# Patient Record
Sex: FEMALE | Race: WHITE
[De-identification: names, ages, dates, MRNs, and addresses within clinical notes are randomized per-mention and may not be internally consistent; named-entity substitution may affect disease eponyms.]

---

## 2020-06-04 ENCOUNTER — HOSPITAL ENCOUNTER (EMERGENCY)
Dept: HOSPITAL 62 - ER | Age: 35
LOS: 1 days | Discharge: HOME | End: 2020-06-05
Payer: MEDICAID

## 2020-06-04 DIAGNOSIS — Z88.2: ICD-10-CM

## 2020-06-04 DIAGNOSIS — R11.0: ICD-10-CM

## 2020-06-04 DIAGNOSIS — R30.0: ICD-10-CM

## 2020-06-04 DIAGNOSIS — R50.9: ICD-10-CM

## 2020-06-04 DIAGNOSIS — Z88.0: ICD-10-CM

## 2020-06-04 DIAGNOSIS — N12: Primary | ICD-10-CM

## 2020-06-04 DIAGNOSIS — R31.9: ICD-10-CM

## 2020-06-04 PROCEDURE — 87086 URINE CULTURE/COLONY COUNT: CPT

## 2020-06-04 PROCEDURE — S0119 ONDANSETRON 4 MG: HCPCS

## 2020-06-04 PROCEDURE — 87088 URINE BACTERIA CULTURE: CPT

## 2020-06-04 PROCEDURE — 81025 URINE PREGNANCY TEST: CPT

## 2020-06-04 PROCEDURE — 99283 EMERGENCY DEPT VISIT LOW MDM: CPT

## 2020-06-04 PROCEDURE — 81001 URINALYSIS AUTO W/SCOPE: CPT

## 2020-06-04 PROCEDURE — 87186 SC STD MICRODIL/AGAR DIL: CPT

## 2020-06-04 PROCEDURE — 36415 COLL VENOUS BLD VENIPUNCTURE: CPT

## 2020-06-05 VITALS — SYSTOLIC BLOOD PRESSURE: 115 MMHG | DIASTOLIC BLOOD PRESSURE: 91 MMHG

## 2020-06-05 LAB
APPEARANCE UR: (no result)
APTT PPP: YELLOW S
BILIRUB UR QL STRIP: NEGATIVE
GLUCOSE UR STRIP-MCNC: NEGATIVE MG/DL
KETONES UR STRIP-MCNC: NEGATIVE MG/DL
PH UR STRIP: 5 [PH] (ref 5–9)
PROT UR STRIP-MCNC: 100 MG/DL
SP GR UR STRIP: 1.02
UROBILINOGEN UR-MCNC: NEGATIVE MG/DL (ref ?–2)
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
## 2020-09-23 ENCOUNTER — HOSPITAL ENCOUNTER (EMERGENCY)
Dept: HOSPITAL 62 - ER | Age: 35
Discharge: HOME | End: 2020-09-23
Payer: MEDICAID

## 2020-09-23 VITALS — DIASTOLIC BLOOD PRESSURE: 96 MMHG | SYSTOLIC BLOOD PRESSURE: 146 MMHG

## 2020-09-23 DIAGNOSIS — K04.7: Primary | ICD-10-CM

## 2020-09-23 PROCEDURE — 99283 EMERGENCY DEPT VISIT LOW MDM: CPT

## 2020-10-24 ENCOUNTER — HOSPITAL ENCOUNTER (EMERGENCY)
Dept: HOSPITAL 62 - ER | Age: 35
Discharge: HOME | End: 2020-10-24
Payer: MEDICAID

## 2020-10-24 VITALS — DIASTOLIC BLOOD PRESSURE: 97 MMHG | SYSTOLIC BLOOD PRESSURE: 141 MMHG

## 2020-10-24 DIAGNOSIS — S20.219A: ICD-10-CM

## 2020-10-24 DIAGNOSIS — S40.212A: ICD-10-CM
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
## 2020-10-24 DIAGNOSIS — F17.200: ICD-10-CM

## 2020-10-24 DIAGNOSIS — S16.1XXA: Primary | ICD-10-CM

## 2020-10-24 DIAGNOSIS — Z88.2: ICD-10-CM
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
## 2020-10-24 DIAGNOSIS — R55: ICD-10-CM

## 2020-10-24 DIAGNOSIS — Z88.0: ICD-10-CM

## 2020-10-24 DIAGNOSIS — S80.11XA: ICD-10-CM

## 2020-10-24 PROCEDURE — 71250 CT THORAX DX C-: CPT

## 2020-10-24 PROCEDURE — 93005 ELECTROCARDIOGRAM TRACING: CPT

## 2020-10-24 PROCEDURE — 72125 CT NECK SPINE W/O DYE: CPT

## 2020-10-24 PROCEDURE — 93010 ELECTROCARDIOGRAM REPORT: CPT

## 2020-10-24 PROCEDURE — 99284 EMERGENCY DEPT VISIT MOD MDM: CPT

## 2020-10-24 PROCEDURE — 70450 CT HEAD/BRAIN W/O DYE: CPT
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
## 2022-07-18 NOTE — ER DOCUMENT REPORT
HPI





- HPI


Time Seen by Provider: 20 06:12


Pain Level: 4


Context: 


Patient is a 34 year old female who comes emergency department for chief 

complaint of dental pain.  She states she has pain in the left upper gumline 

with redness and swelling.  She states this is been going on for about 2 days.  

She states she has had dental infections in the past but not recently.  She 

states she also is new to the area, has dental insurance, and is looking for a 

dentist.  She denies sore throat, neck pain, fever, pregnancy, or any other 

complaints.








- REPRODUCTIVE


Reproductive: DENIES: Pregnant:





Past Medical History





- General


Information source: Patient





- Social History


Smoking Status: Never Smoker


Chew tobacco use (# tins/day): No


Frequency of alcohol use: None


Drug Abuse: None


Lives with: Family


Family History: Reviewed & Not Pertinent


Patient has homicidal ideation: No


Endocrine Medical History: Reports: Hx Graves' Disease


Past Surgical History: Reports: Hx  Section





- Immunizations


Hx Diphtheria, Pertussis, Tetanus Vaccination: Yes





Vertical Provider Document





- CONSTITUTIONAL


General Appearance: WD/WN.  negative: No Apparent Distress - Patient appears 

somewhat uncomfortable but is not in severe distress





- HEENT


HEENT: Atraumatic, Normocephalic, PERRLA.  negative: Conjuctival Injection, 

Pharyngeal Exudate, Pharyngeal Tenderness, Pharyngeal Erythema, Tympanic Mem

brane Red, Tympanic Membrane Bulging


Mouth Diagram: 


                            __________________________














                            __________________________





 1 - Widespread dental caries but in this location there is erythema and 

tenderness of the gumline.  I do not note any severe swelling, there is no 

induration or fluctuance, no abscesses noted.  Otherwise unremarkable.








- NECK


Neck: Normal Inspection





- RESPIRATORY


Respiratory: Breath Sounds Normal, No Respiratory Distress





- CARDIOVASCULAR


Cardiovascular: Regular Rate, Regular Rhythm





- GI/ABDOMEN


Gastrointestinal: Abdomen Soft, Abdomen Non-Tender.  negative: Abdomen Tender





- BACK


Back: Normal Inspection





- MUSCULOSKELETAL/EXTREMETIES


Musculoskeletal/Extremeties: MAEW, FROM, Non-Tender





- NEURO


Level of Consciousness: Awake, Alert, Appropriate


Motor/Sensory: No Motor Deficit, No Sensory Deficit





- DERM


Integumentary: Warm, Dry, No Rash





Course





- Re-evaluation


Re-evalutation: 


Patient with widespread dental caries, dental infection noted in the left upper 

gumline, no concerning findings otherwise, no evidence of abscess at this time. 

Discussed care, follow-up, return precautions.  Patient states understanding and

agreement.





- Vital Signs


Vital signs: 


                                        











Temp Pulse Resp BP Pulse Ox


 


 97.4 F   66   20   138/102 H  100 


 


 20 04:13  20 04:13  20 04:13  20 04:13  20 04:13














Discharge





- Discharge


Clinical Impression: 


 Pain, dental, Dental infection





Condition: Stable


Disposition: HOME, SELF-CARE


Additional Instructions: 


Your evaluation is consistent with a dental infection.


Take the antibiotics as prescribed to completion.


Call the listed referral of below to follow-up with the dentist for additional 

management.


Return if you worsen including severe worsening swelling or pain.


Prescriptions: 


Clindamycin HCl [Cleocin 150 mg Capsule] 150 mg PO Q6 #56 capsule no